# Patient Record
Sex: FEMALE | Race: ASIAN | NOT HISPANIC OR LATINO
[De-identification: names, ages, dates, MRNs, and addresses within clinical notes are randomized per-mention and may not be internally consistent; named-entity substitution may affect disease eponyms.]

---

## 2021-06-15 ENCOUNTER — RESULT REVIEW (OUTPATIENT)
Age: 36
End: 2021-06-15

## 2021-06-28 PROBLEM — Z00.00 ENCOUNTER FOR PREVENTIVE HEALTH EXAMINATION: Status: ACTIVE | Noted: 2021-06-28

## 2021-07-02 ENCOUNTER — APPOINTMENT (OUTPATIENT)
Dept: NEUROSURGERY | Facility: CLINIC | Age: 36
End: 2021-07-02
Payer: COMMERCIAL

## 2021-07-02 VITALS
WEIGHT: 118 LBS | DIASTOLIC BLOOD PRESSURE: 73 MMHG | OXYGEN SATURATION: 99 % | SYSTOLIC BLOOD PRESSURE: 109 MMHG | RESPIRATION RATE: 12 BRPM | TEMPERATURE: 98.7 F | HEART RATE: 59 BPM | BODY MASS INDEX: 18.96 KG/M2 | HEIGHT: 66 IN

## 2021-07-02 DIAGNOSIS — Z87.898 PERSONAL HISTORY OF OTHER SPECIFIED CONDITIONS: ICD-10-CM

## 2021-07-02 DIAGNOSIS — G43.909 MIGRAINE, UNSPECIFIED, NOT INTRACTABLE, W/OUT STATUS MIGRAINOSUS: ICD-10-CM

## 2021-07-02 PROCEDURE — 99204 OFFICE O/P NEW MOD 45 MIN: CPT

## 2021-07-02 NOTE — PHYSICAL EXAM
[General Appearance - Alert] : alert [General Appearance - In No Acute Distress] : in no acute distress [Oriented To Time, Place, And Person] : oriented to person, place, and time [Cranial Nerves Optic (II)] : visual acuity intact bilaterally,  pupils equal round and reactive to light [Cranial Nerves Oculomotor (III)] : extraocular motion intact [Cranial Nerves Trigeminal (V)] : facial sensation intact symmetrically [Cranial Nerves Glossopharyngeal (IX)] : tongue and palate midline [Cranial Nerves Accessory (XI - Cranial And Spinal)] : head turning and shoulder shrug symmetric [Cranial Nerves Hypoglossal (XII)] : there was no tongue deviation with protrusion [Motor Tone] : muscle tone was normal in all four extremities [Motor Strength] : muscle strength was normal in all four extremities [Sclera] : the sclera and conjunctiva were normal [Outer Ear] : the ears and nose were normal in appearance [Neck Appearance] : the appearance of the neck was normal [] : no respiratory distress [Abnormal Walk] : normal gait [Skin Color & Pigmentation] : normal skin color and pigmentation [FreeTextEntry5] : RIGHT facial paralysis

## 2021-07-02 NOTE — REASON FOR VISIT
[Referred By: _________] : Patient was referred by ALESSANDRA [FreeTextEntry1] : Residual Acoustic neuroma

## 2021-07-02 NOTE — ASSESSMENT
[FreeTextEntry1] : Preoperative MRI brain/IACs with and without contrast reviewed by Dr. Correia with patient\par Postop CT head without contrast reviewed by Dr. Correia, demonstrates residual acoustic neuroma.\par Gamma Knife Radiosurgery recommended in 3 fractions. Risks, alternatives and benefits to Gamma Knife Radiosurgery discussed. Risks include but are not limited to cerebral edema, radionecrosis, seizures, continued tumor growth. Patient understands and wishes to proceed.\par \par Explained procedure to patient in detail including placement of stabilizing mask, new MRI the day of procedure, radiation treatment, and post-procedure care. \par Radiation treatment will take place at [address] [place] [address]. Directions and contact information provided to patient.\par Education packet regarding Gamma Knife Radiosurgery provided.\par Multiple questions answered to patient's satisfaction.\par \par \par Patient and patient's family verbalize agreement and understanding with plan of care.\par \par I, Dr. Correia, personally performed the evaluation and management (E/M) services for this new patient.  That E/M includes conducting the initial examination, assessing all conditions, and establishing the plan of care.  Today, my ACP, Cookie Petersen, was here to observe my evaluation and management services for this patient to be followed going forward.\par \par \par -------------------------------------------------\par Number and Complexity of Problems: Residual/Recurrent Acoustic Neuroma\par \par Amount/Complexity of Data Reviewed/Analyzed: Moderate - Dr. Correia independently reviewed and interpreted MRI brain \par \par Risk of Complications and/or Morbidity or Mortality of Patient Management: Moderate - recommend GKRS for residual/recurrent tumor; procedure explained in detail - all risks, benefits and alternatives to surgery discussed\par

## 2021-07-02 NOTE — HISTORY OF PRESENT ILLNESS
[de-identified] : 35 year old woman with past medical history migraines referred by Dr. Max Wang for consideration of GKRS to residual acoustic neuroma.\par \par Ms. Smallwood is primarily Mandarin speaking. Interpretation is provided via [email or web address], ID # [id number].\par \par She states that she was found to have a RIGHT vestibular schwannoma during workup for dizziness. She underwent first surgical resection in December 2020. Follow up imaging demonstrated tumor recurrence and she underwent a second surgical resection in June 2021 with Dr. Wang. Pathology indicates vestibular schwannoma, WHO Grade I. She does have residual tumor and presents today for consideration of GKRS to residual tumor.\par \par Postoperatively, she reports RIGHT facial weakness. She reports some mild difficulty swallowing and states she has been eating softer foods. She also reports hearing loss in the RIGHT ear. She denies headaches and reports improvement in dizziness and vertigo.

## 2021-07-03 NOTE — REASON FOR VISIT
[Consideration of Therapy for Benign Disease] : consideration of therapy for benign disease [Other: ___] : [unfilled] [Brain Tumor] : brain tumor

## 2021-07-08 ENCOUNTER — NON-APPOINTMENT (OUTPATIENT)
Age: 36
End: 2021-07-08

## 2021-07-08 ENCOUNTER — OUTPATIENT (OUTPATIENT)
Dept: OUTPATIENT SERVICES | Facility: HOSPITAL | Age: 36
LOS: 1 days | Discharge: HOME | End: 2021-07-08
Payer: MEDICAID

## 2021-07-08 ENCOUNTER — APPOINTMENT (OUTPATIENT)
Dept: RADIATION ONCOLOGY | Facility: CLINIC | Age: 36
End: 2021-07-08
Payer: COMMERCIAL

## 2021-07-08 DIAGNOSIS — Z78.9 OTHER SPECIFIED HEALTH STATUS: ICD-10-CM

## 2021-07-08 PROCEDURE — 99443: CPT | Mod: 95

## 2021-07-08 NOTE — REVIEW OF SYSTEMS
[Night Sweats] : night sweats [Dysphagia] : dysphagia [Loss of Hearing] : loss of hearing [Difficulty Walking] : difficulty walking [Negative] : Genitourinary [Fever] : no fever [Chills] : no chills [Chest Pain] : no chest pain [Shortness Of Breath] : no shortness of breath [Dizziness] : no dizziness [Fainting] : no fainting [FreeTextEntry3] : right vision blurry after surgery, able to close right eye [FreeTextEntry4] : swallows soft foods [de-identified] : right facial weakness after surgery. Dizziness improved after first surgery (12/2020), gait improved after second surgery (6/2021)

## 2021-07-19 ENCOUNTER — APPOINTMENT (OUTPATIENT)
Dept: MRI IMAGING | Facility: IMAGING CENTER | Age: 36
End: 2021-07-19

## 2021-07-19 ENCOUNTER — OUTPATIENT (OUTPATIENT)
Dept: OUTPATIENT SERVICES | Facility: HOSPITAL | Age: 36
LOS: 1 days | End: 2021-07-19
Payer: MEDICAID

## 2021-07-19 DIAGNOSIS — D33.3 BENIGN NEOPLASM OF CRANIAL NERVES: ICD-10-CM

## 2021-07-19 PROCEDURE — A9585: CPT

## 2021-07-19 PROCEDURE — 77290 THER RAD SIMULAJ FIELD CPLX: CPT | Mod: 26

## 2021-07-19 PROCEDURE — 77334 RADIATION TREATMENT AID(S): CPT | Mod: 26

## 2021-07-19 PROCEDURE — 76498 UNLISTED MR PROCEDURE: CPT

## 2021-07-20 ENCOUNTER — APPOINTMENT (OUTPATIENT)
Dept: NEUROSURGERY | Facility: AMBULATORY SURGERY CENTER | Age: 36
End: 2021-07-20
Payer: COMMERCIAL

## 2021-07-20 PROCEDURE — 61798 SRS CRANIAL LESION COMPLEX: CPT

## 2021-07-20 PROCEDURE — 77295 3-D RADIOTHERAPY PLAN: CPT | Mod: 26

## 2021-07-20 PROCEDURE — 77300 RADIATION THERAPY DOSE PLAN: CPT | Mod: 26

## 2021-07-20 PROCEDURE — 77263 THER RADIOLOGY TX PLNG CPLX: CPT

## 2021-07-20 PROCEDURE — 77334 RADIATION TREATMENT AID(S): CPT | Mod: 26

## 2021-07-22 DIAGNOSIS — K21.9 GASTRO-ESOPHAGEAL REFLUX DISEASE W/OUT ESOPHAGITIS: ICD-10-CM

## 2021-07-22 PROCEDURE — 77435 SBRT MANAGEMENT: CPT

## 2021-07-22 RX ORDER — METHIMAZOLE 5 MG/1
5 TABLET ORAL DAILY
Refills: 0 | Status: ACTIVE | COMMUNITY
Start: 2021-07-22

## 2021-07-22 RX ORDER — ACETAMINOPHEN 500 MG/1
500 TABLET, COATED ORAL
Refills: 0 | Status: ACTIVE | COMMUNITY
Start: 2021-07-22

## 2021-07-22 RX ORDER — CHOLECALCIFEROL (VITAMIN D3) 1250 MCG
1.25 MG CAPSULE ORAL
Refills: 0 | Status: ACTIVE | COMMUNITY
Start: 2021-07-22

## 2021-07-22 RX ORDER — CETIRIZINE HYDROCHLORIDE 10 MG/1
10 CAPSULE, LIQUID FILLED ORAL
Refills: 0 | Status: ACTIVE | COMMUNITY
Start: 2021-07-22

## 2021-07-22 RX ORDER — FAMOTIDINE 20 MG/1
20 TABLET, FILM COATED ORAL
Refills: 0 | Status: DISCONTINUED | COMMUNITY
End: 2021-07-22

## 2021-07-22 RX ORDER — MULTIVITAMIN
TABLET ORAL
Refills: 0 | Status: ACTIVE | COMMUNITY
Start: 2021-07-22

## 2021-07-22 RX ORDER — DEXAMETHASONE 1 MG/1
1 TABLET ORAL
Refills: 0 | Status: DISCONTINUED | COMMUNITY
End: 2021-07-22

## 2021-07-29 DIAGNOSIS — D33.3 BENIGN NEOPLASM OF CRANIAL NERVES: ICD-10-CM

## 2021-08-10 ENCOUNTER — NON-APPOINTMENT (OUTPATIENT)
Age: 36
End: 2021-08-10

## 2021-08-23 ENCOUNTER — APPOINTMENT (OUTPATIENT)
Dept: NEUROSURGERY | Facility: CLINIC | Age: 36
End: 2021-08-23
Payer: COMMERCIAL

## 2021-08-23 VITALS
SYSTOLIC BLOOD PRESSURE: 116 MMHG | BODY MASS INDEX: 18.96 KG/M2 | RESPIRATION RATE: 18 BRPM | TEMPERATURE: 98 F | OXYGEN SATURATION: 98 % | HEART RATE: 80 BPM | WEIGHT: 118 LBS | HEIGHT: 66 IN | DIASTOLIC BLOOD PRESSURE: 80 MMHG

## 2021-08-23 DIAGNOSIS — R26.89 OTHER ABNORMALITIES OF GAIT AND MOBILITY: ICD-10-CM

## 2021-08-23 PROCEDURE — 99024 POSTOP FOLLOW-UP VISIT: CPT

## 2021-08-24 RX ORDER — FAMOTIDINE 20 MG/1
20 TABLET, FILM COATED ORAL DAILY
Qty: 30 | Refills: 0 | Status: DISCONTINUED | COMMUNITY
Start: 2021-07-22 | End: 2021-08-24

## 2021-08-24 RX ORDER — DEXAMETHASONE 4 MG/1
4 TABLET ORAL EVERY 8 HOURS
Qty: 40 | Refills: 0 | Status: DISCONTINUED | COMMUNITY
Start: 2021-07-20 | End: 2021-08-24

## 2021-08-24 NOTE — PHYSICAL EXAM
[General Appearance - Alert] : alert [General Appearance - In No Acute Distress] : in no acute distress [Oriented To Time, Place, And Person] : oriented to person, place, and time [Motor Tone] : muscle tone was normal in all four extremities [Motor Strength] : muscle strength was normal in all four extremities [Sclera] : the sclera and conjunctiva were normal [Outer Ear] : the ears and nose were normal in appearance [Neck Appearance] : the appearance of the neck was normal [Abnormal Walk] : normal gait [Skin Color & Pigmentation] : normal skin color and pigmentation [FreeTextEntry1] : pin sites well healed [FreeTextEntry5] : RIGHT facial droop

## 2021-08-24 NOTE — REASON FOR VISIT
[Follow-Up: _____] : a [unfilled] follow-up visit [[employer] ] : provided by [employer]   [FreeTextEntry1] : [id number] [TWNoteComboBox1] : Chinese

## 2021-08-24 NOTE — HISTORY OF PRESENT ILLNESS
[de-identified] : 35 year old woman with past medical history migraines referred by Dr. Max Wang for consideration of GKRS to residual acoustic neuroma.\par \par Ms. Smallwood is primarily Mandarin speaking. Interpretation is provided via [employer], ID # [id number].\par \par She states that she was found to have a RIGHT vestibular schwannoma during workup for dizziness. She underwent first surgical resection in December 2020. Follow up imaging demonstrated tumor recurrence and she underwent a second surgical resection in June 2021 with Dr. Wang. Pathology indicates vestibular schwannoma, WHO Grade I. She does have residual tumor and presents today for consideration of GKRS to residual tumor.\par \par Postoperatively, she reports RIGHT facial weakness. She reports some mild difficulty swallowing and states she has been eating softer foods. She also reports hearing loss in the RIGHT ear. She denies headaches and reports improvement in dizziness and vertigo.\par \par She underwent GKRS in 3 fractions between 7/20-7/22/21. \par \par She returns today to follow up s/p GKRS. She states she is doing well. Reports some imbalance and unsteady gait. Denies falls. \par She has stopped dexamethasone.

## 2021-08-24 NOTE — ASSESSMENT
[FreeTextEntry1] : Doing well s/p GKRS in 3 fractions in July 2021.\par Recommend physical therapy for imbalance\par Recommend repeat MRI brain in 6 months (January 2021). After MRI, return to the office to review with Dr. Correia.\par \par Patient and patient's family verbalize agreement and understanding with plan of care.\par \par I, Dr. Correia, personally performed the evaluation and management (E/M) services for this established patient who presents today with (a) new problem(s)/exacerbation of (an) existing condition(s).  That E/M includes conducting the examination, assessing all new/exacerbated conditions, and establishing a new plan of care.  Today, my ACP, Cookie Petersen, was here to observe my evaluation and management services for this new problem/exacerbated condition to be followed going forward.\par \par

## 2022-01-21 ENCOUNTER — APPOINTMENT (OUTPATIENT)
Dept: NEUROSURGERY | Facility: CLINIC | Age: 37
End: 2022-01-21
Payer: MEDICAID

## 2022-01-21 VITALS
HEART RATE: 67 BPM | WEIGHT: 118 LBS | TEMPERATURE: 98 F | DIASTOLIC BLOOD PRESSURE: 74 MMHG | BODY MASS INDEX: 18.96 KG/M2 | OXYGEN SATURATION: 98 % | SYSTOLIC BLOOD PRESSURE: 113 MMHG | RESPIRATION RATE: 18 BRPM | HEIGHT: 66 IN

## 2022-01-21 DIAGNOSIS — R26.81 UNSTEADINESS ON FEET: ICD-10-CM

## 2022-01-21 DIAGNOSIS — D33.3 BENIGN NEOPLASM OF CRANIAL NERVES: ICD-10-CM

## 2022-01-21 PROCEDURE — 99214 OFFICE O/P EST MOD 30 MIN: CPT

## 2022-01-21 NOTE — ADDENDUM
[FreeTextEntry1] : s/p adjuvant gamma knife to residual vestibular schwannoma. right facial strength improved. 6-month followup MRI shows stable disease in the right IAC and CP angle. Plan for repeat MRI i 6 months, patient prefers to have it done a [facility].\par \par Braden Correia M.D.

## 2022-01-21 NOTE — ASSESSMENT
[FreeTextEntry1] : 1/6/2022 MRI brain from [facility] shows decreasing in size of the acoustic neuroma.\par Should repeat MRI brain w/wo contrast in 6 month (July 2022) with Dr. Wang of [facility] and follow up with Dr. Correia to review it.

## 2022-01-21 NOTE — REASON FOR VISIT
[Follow-Up: _____] : a [unfilled] follow-up visit [Source: ______] : History obtained from [unfilled] [FreeTextEntry1] : to review 6 month MRI brain s/p GKRS of R acoustic neuroma

## 2022-01-21 NOTE — HISTORY OF PRESENT ILLNESS
[FreeTextEntry1] : Mandarin speaking 35 year old woman with past medical history migraines referred by Dr. Max Wang for consideration of GKRS to residual acoustic neuroma.\par \par Ms. Smallwood is primarily Mandarin speaking. Interpretation is provided via [employer], ID # [id number].\par \par She states that she was found to have a RIGHT vestibular schwannoma during workup for dizziness. She underwent first surgical resection in December 2020. Follow up imaging demonstrated tumor recurrence and she underwent a second surgical resection in June 2021 with Dr. Wang. Pathology indicates vestibular schwannoma, WHO Grade I. She does have residual tumor and presents today for consideration of GKRS to residual tumor.\par \par Postoperatively, she reports RIGHT facial weakness. She reports some mild difficulty swallowing and states she has been eating softer foods. She also reports hearing loss in the RIGHT ear. She denies headaches and reports improvement in dizziness and vertigo.\par \par She underwent GKRS in 3 fractions between 7/20-7/22/21. Reported some imbalance on post op follow up and was referred to PT.\par \francesca Presents to the office for a 6 month MRI brain review. She reports general improvement since the last office visit. She still reports some imbalance, swaying to the left, and right hearing loss. Denies headaches.\par

## 2022-01-21 NOTE — PHYSICAL EXAM
[General Appearance - Alert] : alert [General Appearance - In No Acute Distress] : in no acute distress [Oriented To Time, Place, And Person] : oriented to person, place, and time [Cranial Nerves Optic (II)] : visual acuity intact bilaterally,  pupils equal round and reactive to light [Cranial Nerves Oculomotor (III)] : extraocular motion intact [Cranial Nerves Trigeminal (V)] : facial sensation intact symmetrically [Cranial Nerves Vestibulocochlear (VIII)] : hearing was intact bilaterally [Cranial Nerves Glossopharyngeal (IX)] : tongue and palate midline [Cranial Nerves Accessory (XI - Cranial And Spinal)] : head turning and shoulder shrug symmetric [Cranial Nerves Hypoglossal (XII)] : there was no tongue deviation with protrusion [Motor Tone] : muscle tone was normal in all four extremities [Motor Strength] : muscle strength was normal in all four extremities [Abnormal Walk] : normal gait [Balance] : balance was intact [Neck Appearance] : the appearance of the neck was normal [] : no respiratory distress [Respiration, Rhythm And Depth] : normal respiratory rhythm and effort [Heart Rate And Rhythm] : heart rate was normal and rhythm regular [FreeTextEntry5] : RIGHT facial asymmetry and droop, able to close b/l eyes

## 2022-12-01 ENCOUNTER — RESULT REVIEW (OUTPATIENT)
Age: 37
End: 2022-12-01